# Patient Record
Sex: MALE | Race: WHITE | Employment: FULL TIME | URBAN - NONMETROPOLITAN AREA
[De-identification: names, ages, dates, MRNs, and addresses within clinical notes are randomized per-mention and may not be internally consistent; named-entity substitution may affect disease eponyms.]

---

## 2018-01-03 ENCOUNTER — APPOINTMENT (OUTPATIENT)
Dept: GENERAL RADIOLOGY | Age: 58
End: 2018-01-03
Payer: COMMERCIAL

## 2018-01-03 ENCOUNTER — HOSPITAL ENCOUNTER (EMERGENCY)
Age: 58
Discharge: HOME | End: 2018-01-03
Attending: FAMILY MEDICINE
Payer: COMMERCIAL

## 2018-01-03 VITALS
WEIGHT: 163.2 LBS | DIASTOLIC BLOOD PRESSURE: 77 MMHG | OXYGEN SATURATION: 99 % | SYSTOLIC BLOOD PRESSURE: 134 MMHG | HEART RATE: 75 BPM | TEMPERATURE: 98 F | RESPIRATION RATE: 20 BRPM

## 2018-01-03 DIAGNOSIS — M65.832 EXTENSOR TENOSYNOVITIS OF LEFT WRIST: Primary | ICD-10-CM

## 2018-01-03 PROCEDURE — 73110 X-RAY EXAM OF WRIST: CPT

## 2018-01-03 PROCEDURE — 99283 EMERGENCY DEPT VISIT LOW MDM: CPT

## 2018-01-03 RX ORDER — IBUPROFEN 800 MG/1
800 TABLET ORAL EVERY 8 HOURS PRN
Qty: 30 TABLET | Refills: 1 | Status: SHIPPED | OUTPATIENT
Start: 2018-01-03

## 2018-01-03 RX ORDER — PREDNISONE 20 MG/1
60 TABLET ORAL DAILY
Qty: 15 TABLET | Refills: 0 | Status: SHIPPED | OUTPATIENT
Start: 2018-01-03 | End: 2018-01-08

## 2018-01-03 ASSESSMENT — PAIN DESCRIPTION - DESCRIPTORS: DESCRIPTORS: CONSTANT

## 2018-01-03 ASSESSMENT — PAIN DESCRIPTION - LOCATION: LOCATION: WRIST

## 2018-01-03 ASSESSMENT — PAIN DESCRIPTION - FREQUENCY: FREQUENCY: CONTINUOUS

## 2018-01-03 ASSESSMENT — PAIN DESCRIPTION - ONSET: ONSET: ON-GOING

## 2018-01-03 ASSESSMENT — PAIN SCALES - GENERAL
PAINLEVEL_OUTOF10: 4
PAINLEVEL_OUTOF10: 4

## 2018-01-03 ASSESSMENT — PAIN DESCRIPTION - ORIENTATION: ORIENTATION: LEFT

## 2018-01-03 ASSESSMENT — PAIN DESCRIPTION - PROGRESSION: CLINICAL_PROGRESSION: GRADUALLY WORSENING

## 2018-01-03 ASSESSMENT — PAIN DESCRIPTION - PAIN TYPE: TYPE: ACUTE PAIN
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
## 2018-01-04 NOTE — ED PROVIDER NOTES
[facility]  eMERGENCY dEPARTMENT eNCOUnter          Kyara Reyes       Chief Complaint   Patient presents with    Wrist Pain     left wrist that started yesterday around 1600, pt states \"I put a handful of boxes on the machine and when I go home my left wrist started to hurt\"       Nurses Notes reviewed and I agree except as noted in the HPI. HISTORY OF PRESENT ILLNESS    Scar Moraes is a 56 y.o. male who presents To emergency room with complaint of left wrist pain, onset day prior approximately 1600 hrs., onset will patient was wearing a handful of boxes while at work, and points to pain indicating the radial lateral aspect of his distal forearm proximal to the wrist area, pain worse with certain thumb movements. Patient denies any previous injury. Patient rates his pain 4 out of 10, constant continuous and worse with movement. REVIEW OF SYSTEMS     Review of Systems   All other systems reviewed and are negative. PAST MEDICAL HISTORY    has no past medical history on file. SURGICAL HISTORY      has no past surgical history on file. CURRENT MEDICATIONS       Discharge Medication List as of 1/3/2018  6:03 PM          ALLERGIES     has No Known Allergies. FAMILY HISTORY     has no family status information on file. family history is not on file. SOCIAL HISTORY      reports that he has never smoked. He has never used smokeless tobacco. He reports that he does not use drugs. PHYSICAL EXAM     INITIAL VITALS:  weight is 163 lb 3.2 oz (74 kg). His oral temperature is 98 °F (36.7 °C). His blood pressure is 134/77 and his pulse is 75. His respiration is 20 and oxygen saturation is 99%. Physical Exam   Constitutional: Patient is oriented to person, place, and time. Patient appears well-developed and well-nourished. Patient is active and cooperative.       Neck: Full passive range of motion without pain and phonation normal.   Cardiovascular:  Normal program.  Efforts were made to edit the dictations but occasionally words are mis-transcribed.)    MD Kevon Phillips MD  01/04/18 8439

## 2018-01-12 ENCOUNTER — HOSPITAL ENCOUNTER (EMERGENCY)
Age: 58
Discharge: HOME | End: 2018-01-12
Payer: COMMERCIAL

## 2018-01-12 VITALS
HEART RATE: 79 BPM | SYSTOLIC BLOOD PRESSURE: 145 MMHG | HEIGHT: 65 IN | OXYGEN SATURATION: 96 % | BODY MASS INDEX: 27.49 KG/M2 | DIASTOLIC BLOOD PRESSURE: 56 MMHG | RESPIRATION RATE: 16 BRPM | TEMPERATURE: 97.7 F | WEIGHT: 165 LBS

## 2018-01-12 DIAGNOSIS — K08.89 PAIN, DENTAL: Primary | ICD-10-CM

## 2018-01-12 DIAGNOSIS — R22.0 LEFT FACIAL SWELLING: ICD-10-CM

## 2018-01-12 PROCEDURE — 6370000000 HC RX 637 (ALT 250 FOR IP): Performed by: FAMILY MEDICINE

## 2018-01-12 PROCEDURE — 99282 EMERGENCY DEPT VISIT SF MDM: CPT

## 2018-01-12 RX ORDER — AMOXICILLIN AND CLAVULANATE POTASSIUM 875; 125 MG/1; MG/1
1 TABLET, FILM COATED ORAL 2 TIMES DAILY
Qty: 20 TABLET | Refills: 0 | Status: SHIPPED | OUTPATIENT
Start: 2018-01-12 | End: 2018-01-22

## 2018-01-12 RX ORDER — HYDROCODONE BITARTRATE AND ACETAMINOPHEN 5; 325 MG/1; MG/1
1 TABLET ORAL EVERY 6 HOURS PRN
Qty: 10 TABLET | Refills: 0 | Status: SHIPPED | OUTPATIENT
Start: 2018-01-12 | End: 2018-01-15

## 2018-01-12 RX ADMIN — LIDOCAINE HYDROCHLORIDE 15 ML: 20 SOLUTION ORAL; TOPICAL at 15:28

## 2018-01-12 ASSESSMENT — PAIN SCALES - GENERAL: PAINLEVEL_OUTOF10: 10

## 2018-01-12 ASSESSMENT — PAIN DESCRIPTION - PAIN TYPE: TYPE: ACUTE PAIN

## 2018-01-12 NOTE — LETTER
[facility] ED  [address] [address]  Phone: [phone]               January 12, 2018    Patient: Leonila Stein   YOB: 1960   Date of Visit: 1/12/2018       To Whom It May Concern:    Leonila Stein was seen and treated in our emergency department on 1/12/2018. He can return to work on 1/13/18.     Sincerely,       Chano Mar RN         Signature:__________________________________

## 2020-01-01 ENCOUNTER — HOSPITAL ENCOUNTER (EMERGENCY)
Age: 60
End: 2020-07-08
Attending: FAMILY MEDICINE
Payer: COMMERCIAL

## 2020-01-01 VITALS — HEART RATE: 54 BPM

## 2020-01-01 LAB — GLUCOSE BLD-MCNC: 96 MG/DL (ref 65–99)

## 2020-01-01 PROCEDURE — 6360000002 HC RX W HCPCS: Performed by: FAMILY MEDICINE

## 2020-01-01 PROCEDURE — 82947 ASSAY GLUCOSE BLOOD QUANT: CPT

## 2020-01-01 PROCEDURE — 2500000003 HC RX 250 WO HCPCS: Performed by: FAMILY MEDICINE

## 2020-01-01 PROCEDURE — 92950 HEART/LUNG RESUSCITATION CPR: CPT

## 2020-01-01 PROCEDURE — 99284 EMERGENCY DEPT VISIT MOD MDM: CPT

## 2020-01-01 PROCEDURE — 2580000003 HC RX 258: Performed by: FAMILY MEDICINE

## 2020-01-01 RX ORDER — DEXTROSE MONOHYDRATE 50 MG/ML
INJECTION, SOLUTION INTRAVENOUS CONTINUOUS PRN
Status: COMPLETED | OUTPATIENT
Start: 2020-01-01 | End: 2020-01-01

## 2020-01-01 RX ORDER — EPINEPHRINE 0.1 MG/ML
SYRINGE (ML) INJECTION DAILY PRN
Status: COMPLETED | OUTPATIENT
Start: 2020-01-01 | End: 2020-01-01

## 2020-01-01 RX ADMIN — SODIUM BICARBONATE 50 MEQ: 84 INJECTION, SOLUTION INTRAVENOUS at 21:44

## 2020-01-01 RX ADMIN — DEXTROSE MONOHYDRATE 50 ML/HR: 50 INJECTION, SOLUTION INTRAVENOUS at 21:44

## 2020-01-01 RX ADMIN — Medication 1 MG: at 21:50

## 2020-01-01 RX ADMIN — Medication 1 MG: at 21:41

## 2020-01-01 RX ADMIN — Medication 1 MG: at 21:45

## 2020-07-08 NOTE — CODE DOCUMENTATION
Pulse check at 2140 no palpable pulses, resumed CPR  Pulse check at 2143 no palpable pulses, resumed CPR  Pulse check at 2147 no palpable pulses, resumed CPR  Pulse check at 2148 no palpable pulses, resumed CPR  Pulse check at 2151 no palpable pulses, resumed CPR

## 2020-07-08 NOTE — PROGRESS NOTES
Spoke with Codi Cash through [employer] . States she spoke with patients wife who would like to talk to her children regarding organ/tissue donation. Codi Cash states will call pts wife back in am. Would like pt transferred to [facility] at this time. States they will call Lay  home to update on disposition of body.

## 2020-07-08 NOTE — ED PROVIDER NOTES
[facility]  eMERGENCY dEPARTMENT eNCOUnter          CHIEF COMPLAINT     No chief complaint on file. Nurses Notes reviewed and I agree except as noted in the HPI. HISTORY OF PRESENT ILLNESS    Luzmaria Mari is a 64 y.o. male who presents emergency room via EMS from home, patient is in full arrest with CPR in progress by EMS. This initially called for possible CVA with patient having weakness, nausea and vomiting, reported that patient had not been feeling well all day. Per EMS, time of call 2054, on scene 2103, patient was upright and initially alert on arrival, no apparent left-sided weakness, patient heart rate 50 and quickly went into asystole. Patient was quickly taken out to the ambulance, [place] airway established, IO in the right humeral head, CPR initiated via [address]. Patient received 3 epinephrine, 2 mg Narcan, 1 bicarb in route. REVIEW OF SYSTEMS     Review of Systems   Unable to perform ROS: Patient unresponsive          PAST MEDICAL HISTORY    has no past medical history on file. SURGICAL HISTORY      has no past surgical history on file. CURRENT MEDICATIONS       Previous Medications    IBUPROFEN (ADVIL;MOTRIN) 800 MG TABLET    Take 1 tablet by mouth every 8 hours as needed for Pain       ALLERGIES     has No Known Allergies. FAMILY HISTORY     has no family status information on file. family history is not on file. SOCIAL HISTORY      reports that he has never smoked. He has never used smokeless tobacco. He reports that he does not drink alcohol or use drugs. PHYSICAL EXAM     INITIAL VITALS:  pulse is 54. Physical Exam   Constitutional: Unresponsive  HENT:   Head: Normocephalic and atraumatic. Head is without contusion. Right Ear:  external ear normal. No drainage. Left Ear: external ear normal. No drainage. Nose: Nose normal. No nasal deformity. No epistaxis.    Mouth/Throat: Intubated  Eyes: Pupils fixed and round, 3mm bilaterally, no ocular motor reflex  Neck: No gross tracheal deviation or JVD  Cardiovascular: Pulseless by palpation and by Doppler  Pulses: Pulseless  Pulmonary/Chest: Debated and BVM, on auscultation there are lung sounds with BVM bilaterally  Abdominal: Appears slightly distended, however there are no gastric sounds on auscultation with BVM  Musculoskeletal:   Negative acute trauma or deformity  Neurological: Unresponsive    Skin: Skin is slight cool and dry. Patient is not diaphoretic. plethoric appearance of face  Psychiatric: Unresponsive    DIFFERENTIAL DIAGNOSIS:   cardiopulomonary arrest    DIAGNOSTIC RESULTS           RADIOLOGY: non-plain film images(s) such as CT, Ultrasound and MRI are read by the radiologist.  No orders to display       LABS:   Labs Reviewed - No data to display    EMERGENCY DEPARTMENT COURSE:   Vitals:    Vitals:    07/07/20 2147 07/07/20 2148 07/07/20 2149 07/07/20 2149   Pulse: (!) 34 (!) 26 94 54     Patient arrives via EMS in full arrest, brought to ER room #1, transferred to ER bed with [place] device continuing CPR, and ACLS continued while in the emergency room. Please refer to nursing notes regarding medications and times. Patients were pupils were fixed on presentation, no ocular motor reflex, patient has slight plethoric appearance of his upper body. Patient did have a few random agonal breaths during CPR, patient appeared to be in PEA several times, however there was no cardiac activity on ultrasound checks during pulse checks, or via Doppler in the right femoral.    After ~48 minutes of combined EMS and ER CPR, with fixed pupils and no ocular motor reflex, no cardiac activity on repeat ultrasound checks, code was called at 2153 hrs.         EMS Call: [id number]  Scene: 2103  Downtime: ~2105, witnessed by EMS  Arrival ER: ~2127   ToD: 2153     Dr. Maxi Cr notified 2224 hrs, body release, requests contact PCP to sign certificate    PCP Dr. Macarena Hill notified at [phone]

## 2020-07-09 NOTE — PROGRESS NOTES
2020 1520 received call from [facility]. [facility] OF [employer], [address] phone #[phone]) personnel relating patient's wife had requested their services and was wanting information for body to be released to them. Informed patient no longer at this facility. Information given regarding  home that body was released to yesterday. Jann Akins will verify with family to determine if their services still needed.   Alanis Decker